# Patient Record
Sex: FEMALE | Race: AMERICAN INDIAN OR ALASKA NATIVE
[De-identification: names, ages, dates, MRNs, and addresses within clinical notes are randomized per-mention and may not be internally consistent; named-entity substitution may affect disease eponyms.]

---

## 2018-05-21 ENCOUNTER — HOSPITAL ENCOUNTER (EMERGENCY)
Dept: HOSPITAL 31 - C.ER | Age: 33
Discharge: HOME | End: 2018-05-21
Payer: COMMERCIAL

## 2018-05-21 VITALS — OXYGEN SATURATION: 94 %

## 2018-05-21 VITALS — TEMPERATURE: 97.9 F

## 2018-05-21 VITALS — SYSTOLIC BLOOD PRESSURE: 124 MMHG | RESPIRATION RATE: 18 BRPM | HEART RATE: 81 BPM | DIASTOLIC BLOOD PRESSURE: 61 MMHG

## 2018-05-21 DIAGNOSIS — J45.909: Primary | ICD-10-CM

## 2018-05-21 LAB
ALBUMIN SERPL-MCNC: 3.3 G/DL (ref 3.5–5)
ALBUMIN/GLOB SERPL: 0.9 {RATIO} (ref 1–2.1)
ALT SERPL-CCNC: 8 U/L (ref 9–52)
AST SERPL-CCNC: 20 U/L (ref 14–36)
BACTERIA #/AREA URNS HPF: (no result) /[HPF]
BASOPHILS # BLD AUTO: 0.1 K/UL (ref 0–0.2)
BASOPHILS NFR BLD: 1.2 % (ref 0–2)
BILIRUB UR-MCNC: NEGATIVE MG/DL
BUN SERPL-MCNC: 11 MG/DL (ref 7–17)
CALCIUM SERPL-MCNC: 8.6 MG/DL (ref 8.6–10.4)
EOSINOPHIL # BLD AUTO: 1.2 K/UL (ref 0–0.7)
EOSINOPHIL NFR BLD: 17.2 % (ref 0–4)
ERYTHROCYTE [DISTWIDTH] IN BLOOD BY AUTOMATED COUNT: 23.9 % (ref 11.5–14.5)
GFR NON-AFRICAN AMERICAN: > 60
GLUCOSE UR STRIP-MCNC: NORMAL MG/DL
HCG,QUALITATIVE URINE: NEGATIVE
HGB BLD-MCNC: 9.3 G/DL (ref 11–16)
LEUKOCYTE ESTERASE UR-ACNC: (no result) LEU/UL
LYMPHOCYTES # BLD AUTO: 1.9 K/UL (ref 1–4.3)
LYMPHOCYTES NFR BLD AUTO: 28.4 % (ref 20–40)
MCH RBC QN AUTO: 23.4 PG (ref 27–31)
MCHC RBC AUTO-ENTMCNC: 31.6 G/DL (ref 33–37)
MCV RBC AUTO: 74 FL (ref 81–99)
MONOCYTES # BLD: 0.7 K/UL (ref 0–0.8)
MONOCYTES NFR BLD: 10.3 % (ref 0–10)
NEUTROPHILS # BLD: 2.9 K/UL (ref 1.8–7)
NEUTROPHILS NFR BLD AUTO: 42.9 % (ref 50–75)
NRBC BLD AUTO-RTO: 0 % (ref 0–2)
PH UR STRIP: 6 [PH] (ref 5–8)
PLATELET # BLD: 261 K/UL (ref 130–400)
PMV BLD AUTO: 10.1 FL (ref 7.2–11.7)
PROT UR STRIP-MCNC: NEGATIVE MG/DL
RBC # BLD AUTO: 3.97 MIL/UL (ref 3.8–5.2)
RBC # UR STRIP: (no result) /UL
SP GR UR STRIP: 1.03 (ref 1–1.03)
SQUAMOUS EPITHIAL: 3 /HPF (ref 0–5)
UROBILINOGEN UR-MCNC: NORMAL MG/DL (ref 0.2–1)
WBC # BLD AUTO: 6.8 K/UL (ref 4.8–10.8)

## 2018-05-21 PROCEDURE — 94150 VITAL CAPACITY TEST: CPT

## 2018-05-21 PROCEDURE — 96374 THER/PROPH/DIAG INJ IV PUSH: CPT

## 2018-05-21 PROCEDURE — 85025 COMPLETE CBC W/AUTO DIFF WBC: CPT

## 2018-05-21 PROCEDURE — 71045 X-RAY EXAM CHEST 1 VIEW: CPT

## 2018-05-21 PROCEDURE — 70490 CT SOFT TISSUE NECK W/O DYE: CPT

## 2018-05-21 PROCEDURE — 94640 AIRWAY INHALATION TREATMENT: CPT

## 2018-05-21 PROCEDURE — 80053 COMPREHEN METABOLIC PANEL: CPT

## 2018-05-21 PROCEDURE — 81001 URINALYSIS AUTO W/SCOPE: CPT

## 2018-05-21 PROCEDURE — 71250 CT THORAX DX C-: CPT

## 2018-05-21 PROCEDURE — 99284 EMERGENCY DEPT VISIT MOD MDM: CPT

## 2018-05-21 PROCEDURE — 84703 CHORIONIC GONADOTROPIN ASSAY: CPT

## 2018-05-21 NOTE — CT
PROCEDURE:  CT NECK WITHOUT  CONTRAST



HISTORY:

Stridor wheeze, r/o edema



COMPARISON:

No prior study available for comparison



TECHNIQUE:

Contiguous helical/ transaxial sections of the neck without 

intravenous contrast. Coronal and sagittal reformats generated. . 

Note that examination is somewhat limited due to the lack of 

circulating intravenous contrast material. 



Radiation dose: .42 mGy-cm



This CT exam was performed using one or more of the following dose 

reduction techniques: Automated exposure control, adjustment of the 

mA and/or kV according to patient size, and/or use of iterative 

reconstruction technique. .



FINDINGS:

The current study reveals no large cervical masses or collections.  

Multiple small nonspecific cervical lymph nodes are seen bilaterally 

none of which appear pathologically enlarged.  Evaluation of the 

cervical vasculature is limited due to the lack of circulating 

intravenous contrast material. The major salivary glands and thyroid 

gland unremarkable. 



Central airway midline and patent.  No evidence of airway narrowing. 



The oral cavity unremarkable. Vallecular and free margins of the 

epiglottis is well as aryepiglottic folds and pyriform sinuses 

normal. Vocal cords are symmetric. 



Lung apices clear. 



Note is made of complete opacification of the right maxillary sinus 

with mild to moderate mucosal thickening left maxillary sinus.  There 

is also complete opacification of the ethmoid air complex and frontal 

sinus. Complete opacification right chamber sphenoid sinus and mild 

mucosal thickening left chamber sphenoid sinus. There is occlusion of 

the ostiomeatal complexes, frontal and sphenoid ethmoidal recesses. 



The cervical spine unremarkable. 



. 



NASOPHARYNX:

Unremarkable.



SUPRAHYOID NECK:

Unremarkable oropharynx, oral cavity, parapharyngeal space and 

retropharyngeal space.



INFRAHYOID NECK:

Unremarkable larynx, hypopharynx, and supraglottic space. Vocal cords 

intact.



MASS:

None.



GLANDS:

Parotid and submandibular glands unremarkable. Normal size thyroid 

gland, without nodule.



LYMPH NODES:

Normal. No lymphadenopathy.



CERVICAL SPINE:

No fracture or focal lesion. 



OTHER FINDINGS:

None.



IMPRESSION:

Airway is patent. Multiple small nonspecific bilateral cervical lymph 

nodes none of which appear pathologically enlarged. 



There is a diffuse pansinusitis changes with complete opacification 

of the right maxillary antrum, ethmoid air complex frontal sinus and 

right chamber sphenoid sinus.  Mild to moderate mucosal thickening 

left maxillary sinus and mild mucosal thickening left chamber 

sphenoid sinus.

## 2018-05-21 NOTE — C.PDOC
History Of Present Illness


33-year-old female, PMHx includes Asthma, being seen in Dr Franco office, for 

pre-op for sinus surgery, but has been having increased cough and wheeze over 

the past several weeks. Dr West appreciated stridor on exam, and thought she 

may have an airway obstruction, resulting in her being sent to ED for 

evaluation.


Time Seen by Provider: 05/21/18 12:11


Chief Complaint (Nursing): Shortness Of Breath


History Per: Patient


History/Exam Limitations: no limitations


Current Symptoms Are (Timing): Still Present





Past Medical History


Reviewed: Historical Data, Nursing Documentation, Vital Signs


Vital Signs: 


 Last Vital Signs











Temp  97.9 F   05/21/18 11:39


 


Pulse  81   05/21/18 16:06


 


Resp  18   05/21/18 16:06


 


BP  124/61   05/21/18 16:06


 


Pulse Ox  94 L  05/21/18 19:05














- Medical History


PMH: Anemia (iron transfusion), Anxiety, Asthma, Depression


Family History: States: No Known Family Hx





- Social History


Hx Alcohol Use: Yes


Hx Substance Use: No





- Immunization History


Hx Tetanus Toxoid Vaccination: Yes


Hx Influenza Vaccination: Yes


Hx Pneumococcal Vaccination: No





Review Of Systems


Constitutional: Negative for: Fever, Chills


Cardiovascular: Negative for: Chest Pain


Respiratory: Negative for: Shortness of Breath


Gastrointestinal: Negative for: Vomiting


Musculoskeletal: Negative for: Neck Pain, Back Pain


Neurological: Negative for: Weakness, Numbness, Headache, Dizziness





Physical Exam





- Physical Exam


Appears: Non-toxic, No Acute Distress


Skin: Normal Color, Warm, Dry, No Rash


Head: Normacephalic


Eye(s): bilateral: Normal Inspection, PERRL, EOMI


Nose: Normal


Oral Mucosa: Moist


Lips: Normal Appearing


Throat: No Erythema


Neck: Normal ROM, Trachea Midline, Supple


Chest: Symmetrical, No Tenderness, No Ecchymosis, No Subcutaneous Emphysema


Cardiovascular: Rhythm Regular, No Murmur


Respiratory: No Decreased Breath Sounds, No Accessory Muscle Use, No Rales, No 

Rhonchi, No Stridor, Wheezing (Mod expiratory)


Gastrointestinal/Abdominal: Bowel Sounds (active), Soft, No Tenderness


Back: Normal Inspection, No CVA Tenderness


Extremity: Normal ROM, No Deformity, No Swelling


Neurological/Psych: Oriented x3, Normal Speech, Normal Motor


Gait: Steady





ED Course And Treatment





- Laboratory Results


Result Diagrams: 


 05/21/18 13:28





 05/21/18 13:28


O2 Sat by Pulse Oximetry: 94 (RA)


Pulse Ox Interpretation: Abnormal





- CT Scan/US


  ** CT NECK


Other Rad Studies (CT/US): Read By Radiologist, Radiology Report Reviewed


CT/US Interpretation: Accession No. : [id number].  Patient Name / ID : 

JAVY LOGAN  / [id number].  Exam Date : 05/21/2018 13:51:14 ( Approved ).  

Study Comment :  Sex / Age : F  / 33Y.  Creator : Misty Daniels.  Dictator :  

 :  Approver : Logan Sarkar MD.  Approver2 :  Report Date : 05/ 21/2018 13:58:44.  My Comment :  ***********************************************

************************************.  PROCEDURE:  CT NECK WITHOUT  CONTRAST.  

HISTORY:  Stridor wheeze, r/o edema.  COMPARISON:  No prior study available for 

comparison.  TECHNIQUE:  Contiguous helical/ transaxial sections of the neck 

without intravenous contrast. Coronal and sagittal reformats generated. . Note 

that examination is somewhat limited due to the lack of circulating intravenous 

contrast material.  Radiation dose: .42 mGy-cm.  This CT exam was 

performed using one or more of the following dose reduction techniques: 

Automated exposure control, adjustment of the mA and/or kV according to patient 

size, and/or use of iterative reconstruction technique. .  FINDINGS:  The 

current study reveals no large cervical masses or collections.  Multiple small 

nonspecific cervical lymph nodes are seen bilaterally none of which appear 

pathologically enlarged.  Evaluation of the cervical vasculature is limited due 

to the lack of circulating intravenous contrast material. The major salivary 

glands and thyroid gland unremarkable.  Central airway midline and patent.  No 

evidence of airway narrowing.  The oral cavity unremarkable. Vallecular and 

free margins of the epiglottis is well as aryepiglottic folds and pyriform 

sinuses normal. Vocal cords are symmetric.  Lung apices clear.  Note is made of 

complete opacification of the right maxillary sinus with mild to moderate 

mucosal thickening left maxillary sinus.  There is also complete opacification 

of the ethmoid air complex and frontal sinus. Complete opacification right 

chamber sphenoid sinus and mild mucosal thickening left chamber sphenoid sinus. 

There is occlusion of the ostiomeatal complexes, frontal and sphenoid ethmoidal 

recesses.  The cervical spine unremarkable.  .  NASOPHARYNX:  Unremarkable.  

SUPRAHYOID NECK:  Unremarkable oropharynx, oral cavity, parapharyngeal space 

and retropharyngeal space.  INFRAHYOID NECK:  Unremarkable larynx, hypopharynx, 

and supraglottic space. Vocal cords intact.  MASS:  None.  GLANDS:  Parotid and 

submandibular glands unremarkable. Normal size thyroid gland, without nodule.  

LYMPH NODES:  Normal. No lymphadenopathy.  CERVICAL SPINE:  No fracture or 

focal lesion.  OTHER FINDINGS:  None.  IMPRESSION:  Airway is patent. Multiple 

small nonspecific bilateral cervical lymph nodes none of which appear 

pathologically enlarged.  There is a diffuse pansinusitis changes with complete 

opacification of the right maxillary antrum, ethmoid air complex frontal sinus 

and right chamber sphenoid sinus.  Mild to moderate mucosal thickening left 

maxillary sinus and mild mucosal thickening left chamber sphenoid sinus.





  ** CT CHEST


Other Rad Studies (CT/US): Read By Radiologist, Radiology Report Reviewed


CT/US Interpretation: Accession No. : [id number].  Patient Name / ID : 

JAVY LOGAN  / [id number].  Exam Date : 05/21/2018 13:54:07 ( Approved ).  

Study Comment :  Sex / Age : F  / 33Y.  Creator : Misty Daniels.  Dictator : 

Bam Jean MD.   :  Approver : Bam Jean MD.  

Approver2 :  Report Date : 05/21/2018 14:00:40.  My Comment :  *****************

******************************************************************.  PROCEDURE:

  CT Chest without contrast.  HISTORY:  asthma, abnorm CXR.  COMPARISON:  None.

  TECHNIQUE:  Contiguous axial images were obtained through the chest without 

intravenous contrast enhancement. Sagittal and coronal reconstructions were 

performed.  .  Radiation dose (DLP): 931.4 mGy-cm.  This CT exam was performed 

using one or more of the following dose reduction techniques: Automated 

exposure control, adjustment of the mA and/or kV according to patient size, and/

or use of iterative reconstruction technique.  FINDINGS:  LUNGS:  Right apical 

subsegmental atelectasis.  Scattered bilateral ground-glass nodules, 

predominantly in the left upper and right lower lobes, likely infectious/ 

inflammatory in etiology. Visualized airway clear.  MEDIASTINUM:  Unremarkable 

thoracic aorta. No aneurysm. Normal sized heart. Main pulmonary artery 

unremarkable. No vascular congestion. No lymphadenopathy.  PLEURA:  No pleural 

fluid. No pneumothorax.  BONES:  No fracture. No destructive lesion.  UPPER 

ABDOMEN:  Prior gastric sleeve surgery.  OTHER FINDINGS:  None.  IMPRESSION:  

Scattered bilateral ground-glass nodules, predominantly left upper and right 

lower lobes, likely infectious/ inflammatory in etiology.





Medical Decision Making


Medical Decision Making: 





Plan:


* CT Chest/Neck


* Bloodwork


* Chest X-Ray


* Duoneb, SoluMedrol


* UA


* Reassess and Disposition





The patient has had no stridor in the ED and airways are patent. Pulse ox is 

now 96% on RA. On re-exam, the patient reports improvement of symptoms. Lungs 

are CTA, heart is RRR, abdomen is soft, non-tender and the patient is 

tolerating PO well. Ambulatory in the Ed with steady gait. Follow up with the 

medical doctor within 1-2 days. Return if worsened. 








Disposition





- Disposition


Referrals: 


Behin,Babak, MD [Staff Provider] - 


Disposition: HOME/ ROUTINE


Disposition Time: 15:47


Condition: IMPROVED


Additional Instructions: 


Follow up with the medical doctor within 1-2 days. Return if worsened. 


Prescriptions: 


Albuterol HFA [Ventolin HFA 90 mcg/actuation (8 g)] 1 puff IH Q4 PRN #1 puff


 PRN Reason: Wheezing


predniSONE [Prednisone] 20 mg PO BID #10 tab


Instructions:  Asthma in Adults


Forms:  [facility] (English)





- Clinical Impression


Clinical Impression: 


 Asthma








- Scribe Statement


The provider has reviewed the documentation as recorded by the Scribe (Dorian Quinteros)








All medical record entries made by the Scribe were at my direction and 

personally dictated by me. I have reviewed the chart and agree that the record 

accurately reflects my personal performance of the history, physical exam, 

medical decision making, and the department course for this patient. I have 

also personally directed, reviewed, and agree with the discharge instructions 

and disposition.

## 2018-05-21 NOTE — CT
PROCEDURE:  CT Chest without contrast



HISTORY:

asthma, abnorm CXR



COMPARISON:

None.



TECHNIQUE:

Contiguous axial images were obtained through the chest without 

intravenous contrast enhancement. Sagittal and coronal 

reconstructions were performed.







Radiation dose (DLP): 931.4 mGy-cm. 



This CT exam was performed using one or more of the following dose 

reduction techniques: Automated exposure control, adjustment of the 

mA and/or kV according to patient size, and/or use of iterative 

reconstruction technique.



FINDINGS:



LUNGS:

Right apical subsegmental atelectasis.  Scattered bilateral 

ground-glass nodules, predominantly in the left upper and right lower 

lobes, likely infectious/ inflammatory in etiology. Visualized airway 

clear. 



MEDIASTINUM:

Unremarkable thoracic aorta. No aneurysm. Normal sized heart. Main 

pulmonary artery unremarkable. No vascular congestion. No 

lymphadenopathy.



PLEURA:

No pleural fluid. No pneumothorax.



BONES:

No fracture. No destructive lesion. 



UPPER ABDOMEN:

Prior gastric sleeve surgery.



OTHER FINDINGS:

None.



IMPRESSION:

Scattered bilateral ground-glass nodules, predominantly left upper 

and right lower lobes, likely infectious/ inflammatory in etiology.

## 2018-05-21 NOTE — RAD
HISTORY:

asthma, SOB  



COMPARISON:

Chest radiograph dated 04/27/2018 



FINDINGS:



LUNGS:

No active pulmonary disease.



PLEURA:

No significant pleural effusion identified, no pneumothorax apparent.



CARDIOVASCULAR:

Normal.



OSSEOUS STRUCTURES:

No significant abnormalities.



VISUALIZED UPPER ABDOMEN:

Normal.



OTHER FINDINGS:

None.



IMPRESSION:

No active disease.

## 2018-05-29 ENCOUNTER — HOSPITAL ENCOUNTER (OUTPATIENT)
Dept: HOSPITAL 31 - C.SDS | Age: 33
Discharge: HOME | End: 2018-05-29
Attending: OTOLARYNGOLOGY
Payer: COMMERCIAL

## 2018-05-29 VITALS — DIASTOLIC BLOOD PRESSURE: 62 MMHG | HEART RATE: 81 BPM | TEMPERATURE: 97.7 F | SYSTOLIC BLOOD PRESSURE: 113 MMHG

## 2018-05-29 VITALS — BODY MASS INDEX: 47 KG/M2

## 2018-05-29 VITALS — RESPIRATION RATE: 18 BRPM | OXYGEN SATURATION: 95 %

## 2018-05-29 DIAGNOSIS — J32.1: ICD-10-CM

## 2018-05-29 DIAGNOSIS — J34.2: Primary | ICD-10-CM

## 2018-05-29 DIAGNOSIS — J32.2: ICD-10-CM

## 2018-05-29 DIAGNOSIS — J32.3: ICD-10-CM

## 2018-05-29 DIAGNOSIS — J34.3: ICD-10-CM

## 2018-05-29 DIAGNOSIS — J32.0: ICD-10-CM

## 2018-05-29 PROCEDURE — 30520 REPAIR OF NASAL SEPTUM: CPT

## 2018-05-29 PROCEDURE — 31254 NSL/SINS NDSC W/PRTL ETHMDCT: CPT

## 2018-05-29 PROCEDURE — 30801 ABLATE INF TURBINATE SUPERF: CPT

## 2018-05-29 PROCEDURE — 31276 NSL/SINS NDSC FRNT TISS RMVL: CPT

## 2018-05-29 PROCEDURE — 31256 EXPLORATION MAXILLARY SINUS: CPT

## 2018-05-29 PROCEDURE — 31287 NASAL/SINUS ENDOSCOPY SURG: CPT

## 2018-05-29 PROCEDURE — 88304 TISSUE EXAM BY PATHOLOGIST: CPT

## 2018-05-29 RX ADMIN — HYDROMORPHONE HYDROCHLORIDE PRN MG: 1 INJECTION, SOLUTION INTRAMUSCULAR; INTRAVENOUS; SUBCUTANEOUS at 13:05

## 2018-05-29 RX ADMIN — HYDROMORPHONE HYDROCHLORIDE PRN MG: 1 INJECTION, SOLUTION INTRAMUSCULAR; INTRAVENOUS; SUBCUTANEOUS at 12:40

## 2018-05-30 NOTE — OP
PROCEDURE DATE:  05/29/2018



PREOPERATIVE DIAGNOSES:  Chronic sinusitis, deviated septum, large

turbinates.



POSTOPERATIVE DIAGNOSES:  Chronic sinusitis, deviated septum, large

turbinates.



PROCEDURES:  Endoscopic bilateral ethmoidectomy, endoscopic bilateral

maxillary antrostomy, endoscopic bilateral sphenoidotomy, endoscopic

bilateral frontal sinusotomy, septoplasty, and endoscopic bilateral

inferior turbinate reduction.



Polyposis on the ethmoid sinuses on both sides, maxillary and sphenoid

antrum stenosed on both sides, frontal recess stenosed on both sides,

deviated septum, enlarged inferior turbinates.



DESCRIPTION OF PROCEDURE:  The patient was brought into the room, placed in

supine position.  Anesthesia was initiated through an ET tube. 

Adrenaline-soaked pledgets were inserted into the nasal cavity, remained

there for at least 5 minutes and removed.  Navigation was set up and used

throughout the case in order to ensure that the skull base and orbit were

not entered.  The patient was draped in the usual manner.  The septum was

injected with lidocaine with epinephrine on both sides.  A [place] incision

was made on the left, and mucoperichondrial flap was raised on the other

side.  A vertical incision was made in the cartilage leaving a 1.5 cm

anterior and superior strut, and the mucoperichondrial flap was raised on

the other side.  Deviated portion of the bone and cartilage were removed. 

A quilting suture was used to suture the two flaps together and close the

[place] incision.  A 0-degree scope was inserted into the nasal cavity. 

The inferior turbinates were noted to be enlarged, and were reduced in size

using scissors to confirm an inferior to superior, anterior to posterior

direction on both sides, first on the left, then on the right.  Bleeding

was controlled using suction cautery.  Attention was turned to the left. 

The middle turbinate was injected with lidocaine with epinephrine and

medialized.  The uncinate process was medialized.  A debrider was used to

enter the middle meatus which had polyps.  It was also used to remove the

uncinate process.  The ethmoid bulla was entered inferomedially, going

posteriorly to the basal lamella, then anteriorly and superiorly.  The

ethmoid bulla was removed.  The basal lamella was entered.  Posterior

ethmoid cells were entered and opened.  Skull base was identified and

followed anteriorly all the way to the area of the anterior ethmoid air

cells.  The frontal recess was noted to be stenosed and opened using

forceps.  The sphenoid antrum was noted to be stenosed and opened using

forceps.  A curved suction was used to locate the maxillary antrum, which

was noted to be stenosed and opened using forceps.  Attention was turned to

the other side.  The middle turbinate was injected with lidocaine with

epinephrine and medialized.  A [place] elevator was used to medialize the

uncinate process which was removed using a debrider.  Polyps were found

emanating from the middle meatus which were removed using a debrider.  The

ethmoid bulla was entered inferomedially, going posteriorly to the basal

lamella, then anteriorly and superiorly until the ethmoid bulla was

removed.  The basal lamella was entered.  Posterior ethmoid cells were

entered and opened.  Skull base was identified and followed anteriorly all

the way to the area of the anterior ethmoid air cells.  The frontal recess

was noted to be stenosed and opened using forceps.  The sphenoid antrum was

noted to be stenosed and opened using forceps.  Maxillary antrum was noted

to be stenosed and opened using forceps.  Bleeding was controlled using

adrenaline-soaked pledgets and suction cautery.  Splints were placed. 

Stents were placed.  The patient was taken off anesthesia and taken to

recovery room in a stable manner.





__________________________________________

Babak Behin, MD





DD:  05/29/2018 12:33:22

DT:  05/29/2018 16:04:21

Job # [id number]

## 2019-02-28 PROBLEM — Z00.00 ENCOUNTER FOR PREVENTIVE HEALTH EXAMINATION: Status: ACTIVE | Noted: 2019-02-28

## 2019-03-20 ENCOUNTER — HOSPITAL ENCOUNTER (OUTPATIENT)
Dept: HOSPITAL 31 - C.ENDO | Age: 34
Discharge: HOME | End: 2019-03-20
Attending: INTERNAL MEDICINE
Payer: COMMERCIAL

## 2019-03-20 VITALS
DIASTOLIC BLOOD PRESSURE: 64 MMHG | RESPIRATION RATE: 18 BRPM | SYSTOLIC BLOOD PRESSURE: 118 MMHG | HEART RATE: 83 BPM | TEMPERATURE: 98.1 F

## 2019-03-20 VITALS — BODY MASS INDEX: 46.5 KG/M2

## 2019-03-20 VITALS — OXYGEN SATURATION: 98 %

## 2019-03-20 DIAGNOSIS — Z98.84: ICD-10-CM

## 2019-03-20 DIAGNOSIS — K64.8: ICD-10-CM

## 2019-03-20 DIAGNOSIS — D50.9: Primary | ICD-10-CM

## 2019-03-20 DIAGNOSIS — K44.9: ICD-10-CM

## 2019-03-20 LAB — HCG,QUALITATIVE URINE: NEGATIVE
